# Patient Record
Sex: MALE | Race: BLACK OR AFRICAN AMERICAN | NOT HISPANIC OR LATINO | Employment: PART TIME | URBAN - METROPOLITAN AREA
[De-identification: names, ages, dates, MRNs, and addresses within clinical notes are randomized per-mention and may not be internally consistent; named-entity substitution may affect disease eponyms.]

---

## 2023-02-07 ENCOUNTER — TELEPHONE (OUTPATIENT)
Dept: UROLOGY | Facility: MEDICAL CENTER | Age: 18
End: 2023-02-07

## 2023-02-07 NOTE — TELEPHONE ENCOUNTER
Please Triage  New Patient    What is the reason for the patient’s appointment?  erectile dysfunction  What office location does the patient prefer? [place]   Imaging/Lab Results:    Do we accept the patient's insurance or is the patient Self-Pay? Insurance Provider:  Plan Type/Number:[employer]   Member ID#: Has the patient had any previous Urologist(s)?  no  Have patient records been requested? If not are records showing in Epic:   In epic   Has the patient had any outside testing done?in epic     Does the patient have a personal history of cancer? no

## 2023-02-10 NOTE — PROGRESS NOTES
2/13/2023      Chief Complaint   Patient presents with   • Erectile Dysfunction     Assessment and Plan    21 y o  male male new patient     1  ED  - Likely psychogenic  - Recent testosterone level from 1/23/23 within normal range at 613  - Discussed trial of low dose PDE-5 inhibitor which he is interested in  Rx for Sildenafil 25 mg provided  - He wishes to call our office back for appointment should he have ongoing issues  History of Present Illness  Radha Ash is a 21 y o  male here for new patient evaluation of erectile dysfunction  He did have a testosterone level completed last month which was normal at 613  He reports this issue began in October 2022 following break-up with his girlfriend  He reports only partial erections and difficulty getting erections  Denies of any pain with erections or penile curvature  Denies any pelvic injury or trauma  Denies any prior  surgical manipulation  Reports normal pubertal development  Denies any medical history  No smoking or drug use  Review of Systems   Constitutional: Negative for chills and fever  Respiratory: Negative for shortness of breath  Cardiovascular: Negative for chest pain  Gastrointestinal: Negative for abdominal pain  Genitourinary: Negative for difficulty urinating, dysuria, flank pain, frequency, hematuria, penile pain and urgency  Neurological: Negative for dizziness  Past Medical History  History reviewed  No pertinent past medical history  Past Social History  History reviewed  No pertinent surgical history    Social History     Tobacco Use   Smoking Status Never   Smokeless Tobacco Never       Past Family History  Family History   Problem Relation Age of Onset   • No Known Problems Father    • No Known Problems Mother    • No Known Problems Brother        Past Social history  Social History     Socioeconomic History   • Marital status: Single     Spouse name: Not on file   • Number of children: Not on file   • Years of education: Not on file   • Highest education level: Not on file   Occupational History   • Not on file   Tobacco Use   • Smoking status: Never   • Smokeless tobacco: Never   Vaping Use   • Vaping Use: Never used   Substance and Sexual Activity   • Alcohol use: Never   • Drug use: Never   • Sexual activity: Yes     Partners: Female   Other Topics Concern   • Not on file   Social History Narrative   • Not on file     Social Determinants of Health     Financial Resource Strain: Not on file   Food Insecurity: Not on file   Transportation Needs: Not on file   Physical Activity: Not on file   Stress: Not on file   Social Connections: Not on file   Intimate Partner Violence: Not on file   Housing Stability: Not on file       Current Medications  Current Outpatient Medications   Medication Sig Dispense Refill   • benzoyl peroxide 5 % gel APPLY TOPICALLY TO THE AFFECTED AREA(S) TOPICALLY ONCE DAILY     • fluticasone (FLONASE) 50 mcg/act nasal spray USE 1 SPRAY(S) IN EACH NOSTRIL ONCE DAILY AS NEEDED     • loratadine (CLARITIN) 10 mg tablet Take 10 mg by mouth daily     • Adapalene-Benzoyl Peroxide 0 1-2 5 % gel apply to face qd (Patient not taking: Reported on 2/13/2023)     • azithromycin (ZITHROMAX) 250 mg tablet TAKE 2 TABLETS (500 MG) BY ORAL ROUTE ONCE DAILY FOR 1 DAY THEN 1 TABLET (250 MG) BY ORAL ROUTE ONCE DAILY FOR 4 DAYS (Patient not taking: Reported on 2/13/2023)       No current facility-administered medications for this visit         Allergies  Allergies   Allergen Reactions   • Dust Mite Extract Rash   • Pollen Extract Rash         The following portions of the patient's history were reviewed and updated as appropriate: allergies, current medications, past medical history, past social history, past surgical history and problem list       Vitals  Vitals:    02/13/23 0730   BP: 126/76   Pulse: 76   SpO2: 99%   Weight: 69 9 kg (154 lb)   Height: 5' 8" (1 727 m)           Physical Exam  Physical Exam  Constitutional:       Appearance: Normal appearance  He is normal weight  HENT:      Head: Normocephalic and atraumatic  Right Ear: External ear normal       Left Ear: External ear normal       Nose: Nose normal    Eyes:      General: No scleral icterus  Conjunctiva/sclera: Conjunctivae normal    Cardiovascular:      Pulses: Normal pulses  Pulmonary:      Effort: Pulmonary effort is normal    Musculoskeletal:         General: Normal range of motion  Cervical back: Normal range of motion  Neurological:      General: No focal deficit present  Mental Status: He is alert and oriented to person, place, and time  Psychiatric:         Mood and Affect: Mood normal          Behavior: Behavior normal          Thought Content: Thought content normal          Judgment: Judgment normal            Results  No results found for this or any previous visit (from the past 1 hour(s))  ]  No results found for: PSA  No results found for: GLUCOSE, CALCIUM, NA, K, CO2, CL, BUN, CREATININE  No results found for: WBC, HGB, HCT, MCV, PLT        Orders  No orders of the defined types were placed in this encounter        Jayesh Click

## 2023-02-13 ENCOUNTER — OFFICE VISIT (OUTPATIENT)
Dept: UROLOGY | Facility: CLINIC | Age: 18
End: 2023-02-13

## 2023-02-13 VITALS
BODY MASS INDEX: 23.34 KG/M2 | OXYGEN SATURATION: 99 % | HEART RATE: 76 BPM | SYSTOLIC BLOOD PRESSURE: 126 MMHG | HEIGHT: 68 IN | WEIGHT: 154 LBS | DIASTOLIC BLOOD PRESSURE: 76 MMHG

## 2023-02-13 DIAGNOSIS — N52.9 ERECTILE DYSFUNCTION, UNSPECIFIED ERECTILE DYSFUNCTION TYPE: Primary | ICD-10-CM

## 2023-02-13 RX ORDER — AZITHROMYCIN 250 MG/1
TABLET, FILM COATED ORAL
COMMUNITY

## 2023-02-13 RX ORDER — LORATADINE 10 MG/1
10 TABLET ORAL DAILY
COMMUNITY
Start: 2023-02-06 | End: 2024-02-06

## 2023-02-13 RX ORDER — ADAPALENE AND BENZOYL PEROXIDE .1; 2.5 G/100G; G/100G
GEL TOPICAL
COMMUNITY

## 2023-02-13 RX ORDER — FLUTICASONE PROPIONATE 50 MCG
SPRAY, SUSPENSION (ML) NASAL
COMMUNITY

## 2023-02-13 RX ORDER — SILDENAFIL 25 MG/1
25 TABLET, FILM COATED ORAL DAILY PRN
Qty: 10 TABLET | Refills: 0 | Status: SHIPPED | OUTPATIENT
Start: 2023-02-13